# Patient Record
Sex: FEMALE | Race: WHITE | Employment: FULL TIME | URBAN - METROPOLITAN AREA
[De-identification: names, ages, dates, MRNs, and addresses within clinical notes are randomized per-mention and may not be internally consistent; named-entity substitution may affect disease eponyms.]

---

## 2019-08-05 ENCOUNTER — APPOINTMENT (OUTPATIENT)
Dept: GENERAL RADIOLOGY | Age: 44
End: 2019-08-05
Payer: COMMERCIAL

## 2019-08-05 ENCOUNTER — HOSPITAL ENCOUNTER (EMERGENCY)
Age: 44
Discharge: HOME | End: 2019-08-05
Attending: EMERGENCY MEDICINE
Payer: COMMERCIAL

## 2019-08-05 VITALS
WEIGHT: 210 LBS | HEART RATE: 67 BPM | HEIGHT: 62 IN | OXYGEN SATURATION: 100 % | DIASTOLIC BLOOD PRESSURE: 76 MMHG | BODY MASS INDEX: 38.64 KG/M2 | TEMPERATURE: 98.4 F | SYSTOLIC BLOOD PRESSURE: 134 MMHG | RESPIRATION RATE: 19 BRPM

## 2019-08-05 DIAGNOSIS — F41.1 ANXIETY STATE: ICD-10-CM

## 2019-08-05 DIAGNOSIS — R07.89 CHEST DISCOMFORT: Primary | ICD-10-CM

## 2019-08-05 LAB
A/G RATIO: 1.3 (ref 1.1–2.2)
ALBUMIN SERPL-MCNC: 4.1 G/DL (ref 3.4–5)
ALP BLD-CCNC: 41 U/L (ref 40–129)
ALT SERPL-CCNC: 15 U/L (ref 10–40)
ANION GAP SERPL CALCULATED.3IONS-SCNC: 10 MMOL/L (ref 3–16)
AST SERPL-CCNC: 22 U/L (ref 15–37)
BASOPHILS ABSOLUTE: 0.1 K/UL (ref 0–0.2)
BASOPHILS RELATIVE PERCENT: 1.1 %
BILIRUB SERPL-MCNC: 0.4 MG/DL (ref 0–1)
BILIRUBIN URINE: NEGATIVE
BLOOD, URINE: NEGATIVE
BUN BLDV-MCNC: 21 MG/DL (ref 7–20)
CALCIUM SERPL-MCNC: 9 MG/DL (ref 8.3–10.6)
CHLORIDE BLD-SCNC: 105 MMOL/L (ref 99–110)
CLARITY: CLEAR
CO2: 25 MMOL/L (ref 21–32)
COLOR: YELLOW
CREAT SERPL-MCNC: 1.2 MG/DL (ref 0.6–1.1)
D DIMER: <200 NG/ML DDU (ref 0–229)
EKG ATRIAL RATE: 64 BPM
EKG DIAGNOSIS: NORMAL
EKG P AXIS: 26 DEGREES
EKG P-R INTERVAL: 172 MS
EKG Q-T INTERVAL: 406 MS
EKG QRS DURATION: 88 MS
EKG QTC CALCULATION (BAZETT): 418 MS
EKG R AXIS: 18 DEGREES
EKG T AXIS: 29 DEGREES
EKG VENTRICULAR RATE: 64 BPM
EOSINOPHILS ABSOLUTE: 0.2 K/UL (ref 0–0.6)
EOSINOPHILS RELATIVE PERCENT: 3.4 %
EPITHELIAL CELLS, UA: 0 /HPF (ref 0–5)
GFR AFRICAN AMERICAN: 59
GFR NON-AFRICAN AMERICAN: 49
GLOBULIN: 3.1 G/DL
GLUCOSE BLD-MCNC: 98 MG/DL (ref 70–99)
GLUCOSE URINE: NEGATIVE MG/DL
HCG QUALITATIVE: NEGATIVE
HCT VFR BLD CALC: 39.3 % (ref 36–48)
HEMOGLOBIN: 13.5 G/DL (ref 12–16)
HYALINE CASTS: 1 /LPF (ref 0–8)
KETONES, URINE: NEGATIVE MG/DL
LEUKOCYTE ESTERASE, URINE: NEGATIVE
LYMPHOCYTES ABSOLUTE: 1.1 K/UL (ref 1–5.1)
LYMPHOCYTES RELATIVE PERCENT: 18.4 %
MCH RBC QN AUTO: 30.2 PG (ref 26–34)
MCHC RBC AUTO-ENTMCNC: 34.2 G/DL (ref 31–36)
MCV RBC AUTO: 88.1 FL (ref 80–100)
MICROSCOPIC EXAMINATION: YES
MONOCYTES ABSOLUTE: 0.4 K/UL (ref 0–1.3)
MONOCYTES RELATIVE PERCENT: 7.1 %
NEUTROPHILS ABSOLUTE: 4.4 K/UL (ref 1.7–7.7)
NEUTROPHILS RELATIVE PERCENT: 70 %
NITRITE, URINE: NEGATIVE
PDW BLD-RTO: 13.1 % (ref 12.4–15.4)
PH UA: 5.5 (ref 5–8)
PLATELET # BLD: 228 K/UL (ref 135–450)
PMV BLD AUTO: 8.1 FL (ref 5–10.5)
POTASSIUM SERPL-SCNC: 4.5 MMOL/L (ref 3.5–5.1)
PRO-BNP: 249 PG/ML (ref 0–124)
PROTEIN UA: 100 MG/DL
RBC # BLD: 4.46 M/UL (ref 4–5.2)
RBC UA: 0 /HPF (ref 0–4)
REASON FOR REJECTION: NORMAL
REJECTED TEST: NORMAL
SODIUM BLD-SCNC: 140 MMOL/L (ref 136–145)
SPECIFIC GRAVITY UA: 1.01 (ref 1–1.03)
T4 FREE: 1.3 NG/DL (ref 0.9–1.8)
TOTAL PROTEIN: 7.2 G/DL (ref 6.4–8.2)
TROPONIN: <0.01 NG/ML
TROPONIN: <0.01 NG/ML
TSH REFLEX: 5.34 UIU/ML (ref 0.27–4.2)
URINE REFLEX TO CULTURE: ABNORMAL
URINE TYPE: ABNORMAL
UROBILINOGEN, URINE: 0.2 E.U./DL
WBC # BLD: 6.2 K/UL (ref 4–11)
WBC UA: 0 /HPF (ref 0–5)

## 2019-08-05 PROCEDURE — 85379 FIBRIN DEGRADATION QUANT: CPT

## 2019-08-05 PROCEDURE — 83880 ASSAY OF NATRIURETIC PEPTIDE: CPT

## 2019-08-05 PROCEDURE — 96361 HYDRATE IV INFUSION ADD-ON: CPT

## 2019-08-05 PROCEDURE — 84439 ASSAY OF FREE THYROXINE: CPT

## 2019-08-05 PROCEDURE — 93005 ELECTROCARDIOGRAM TRACING: CPT | Performed by: PHYSICIAN ASSISTANT

## 2019-08-05 PROCEDURE — 99284 EMERGENCY DEPT VISIT MOD MDM: CPT

## 2019-08-05 PROCEDURE — 84443 ASSAY THYROID STIM HORMONE: CPT

## 2019-08-05 PROCEDURE — 6360000002 HC RX W HCPCS: Performed by: PHYSICIAN ASSISTANT

## 2019-08-05 PROCEDURE — 71046 X-RAY EXAM CHEST 2 VIEWS: CPT

## 2019-08-05 PROCEDURE — 96374 THER/PROPH/DIAG INJ IV PUSH: CPT

## 2019-08-05 PROCEDURE — 81001 URINALYSIS AUTO W/SCOPE: CPT

## 2019-08-05 PROCEDURE — 2580000003 HC RX 258: Performed by: PHYSICIAN ASSISTANT

## 2019-08-05 PROCEDURE — 6370000000 HC RX 637 (ALT 250 FOR IP): Performed by: PHYSICIAN ASSISTANT

## 2019-08-05 PROCEDURE — 85025 COMPLETE CBC W/AUTO DIFF WBC: CPT

## 2019-08-05 PROCEDURE — 80053 COMPREHEN METABOLIC PANEL: CPT

## 2019-08-05 PROCEDURE — 84484 ASSAY OF TROPONIN QUANT: CPT

## 2019-08-05 PROCEDURE — 84703 CHORIONIC GONADOTROPIN ASSAY: CPT

## 2019-08-05 RX ORDER — 0.9 % SODIUM CHLORIDE 0.9 %
1000 INTRAVENOUS SOLUTION INTRAVENOUS ONCE
Status: COMPLETED | OUTPATIENT
Start: 2019-08-05 | End: 2019-08-05

## 2019-08-05 RX ORDER — LORAZEPAM 2 MG/ML
1 INJECTION INTRAMUSCULAR ONCE
Status: COMPLETED | OUTPATIENT
Start: 2019-08-05 | End: 2019-08-05

## 2019-08-05 RX ORDER — ASPIRIN 81 MG/1
324 TABLET, CHEWABLE ORAL ONCE
Status: COMPLETED | OUTPATIENT
Start: 2019-08-05 | End: 2019-08-05

## 2019-08-05 RX ADMIN — SODIUM CHLORIDE 1000 ML: 9 INJECTION, SOLUTION INTRAVENOUS at 10:11

## 2019-08-05 RX ADMIN — ASPIRIN 81 MG 324 MG: 81 TABLET ORAL at 08:03

## 2019-08-05 RX ADMIN — LORAZEPAM 1 MG: 2 INJECTION INTRAMUSCULAR; INTRAVENOUS at 08:03

## 2019-08-05 ASSESSMENT — ENCOUNTER SYMPTOMS
ABDOMINAL PAIN: 0
NAUSEA: 0
VOMITING: 0
SHORTNESS OF BREATH: 0
RHINORRHEA: 0
DIARRHEA: 0
COUGH: 0

## 2019-08-05 ASSESSMENT — PAIN DESCRIPTION - PAIN TYPE: TYPE: ACUTE PAIN

## 2019-08-05 ASSESSMENT — PAIN DESCRIPTION - ORIENTATION: ORIENTATION: LEFT

## 2019-08-05 ASSESSMENT — PAIN DESCRIPTION - LOCATION: LOCATION: SHOULDER

## 2019-08-05 ASSESSMENT — PAIN SCALES - GENERAL: PAINLEVEL_OUTOF10: 2

## 2019-08-05 NOTE — ED PROVIDER NOTES
[facility]        Pt Name: Sarah Diaz  MRN: [id number]  [place] 1975  Date of evaluation: 8/5/2019  Provider: Abiodun Noyola PA-C  PCP: Jessy Magana    This patient was seen and evaluated by the attending physician Huang Luciano DO.      CHIEF COMPLAINT       Chief Complaint   Patient presents with    Panic Attack     panic attack this am while driving to work.  still has blurry vision and \"feels bluh\"       HISTORY OF PRESENT ILLNESS   (Location/Symptom, Timing/Onset, Context/Setting, Quality, Duration, Modifying Factors, Severity)  Note limiting factors. Sarah Diaz is a 39 y.o. female who presents to the emergency department today for evaluation for chest discomfort. The patient states that she was driving to work, and she states that she noticed chest discomfort to the left side of her chest, she states that she also had some chest tightness, palpitations and shortness of breath. Patient states that she has a history of panic attacks, and she thought that her symptoms today were due to a panic attack. Patient states that she got to work, and she states that most of her symptoms resolved but she states that she is reporting a \"heavy\" feeling throughout her body  Patient states that at one point she did have some blurry vision bilaterally, again thought this was due to her panic attack she denies any vision changes to me at this time. She states that she did have a headache as well, this is not the worst headache of her life, this is not an atypical headache. She is only rating her pain is a 2/10. She denies any numbness, tingling or weakness. She states that her chest discomfort has essentially resolved she is just reporting the heavy feeling throughout her body. She denies any abdominal pain. No nausea, vomiting or diarrhea. No fever chills but no cough or congestion.   Patient states that she has a 100 mg by mouth daily. Historical Med      topiramate (TOPAMAX) 25 MG tablet Take 25 mg by mouth 2 times daily. Historical Med      rivaroxaban (XARELTO) 20 MG TABS tablet Take 20 mg by mouth. Historical Med               ALLERGIES     Methyldopa; Oxycodone-acetaminophen; Penicillins;  Telithromycin; and Venlafaxine    FAMILYHISTORY       Family History   Problem Relation Age of Onset    Diabetes Mother     Stroke Mother     Hypertension Mother     High Cholesterol Mother     Obesity Mother     Alcohol Abuse Father     Cancer Father     Hypertension Father     High Cholesterol Brother     Kidney Disease Brother           SOCIAL HISTORY       Social History     Socioeconomic History    Marital status:      Spouse name: None    Number of children: None    Years of education: None    Highest education level: None   Occupational History    None   Social Needs    Financial resource strain: None    Food insecurity:     Worry: None     Inability: None    Transportation needs:     Medical: None     Non-medical: None   Tobacco Use    Smoking status: Never Smoker    Smokeless tobacco: Never Used   Substance and Sexual Activity    Alcohol use: Not Currently    Drug use: Never    Sexual activity: None   Lifestyle    Physical activity:     Days per week: None     Minutes per session: None    Stress: None   Relationships    Social connections:     Talks on phone: None     Gets together: None     Attends [employer] service: None     Active member of club or organization: None     Attends meetings of clubs or organizations: None     Relationship status: None    Intimate partner violence:     Fear of current or ex partner: None     Emotionally abused: None     Physically abused: None     Forced sexual activity: None   Other Topics Concern    None   Social History Narrative    None       SCREENINGS             PHYSICAL EXAM    (up to 7 for level 4, 8 or more for level 5)     ED Triage Vitals [08/05/19 [facility]  [phone]    As needed, If symptoms worsen      DISCHARGE MEDICATIONS:  Discharge Medication List as of 8/5/2019 12:36 PM          DISCONTINUED MEDICATIONS:  Discharge Medication List as of 8/5/2019 12:36 PM                 (Please note that portions ofthis note were completed with a voice recognition program.  Efforts were made to edit the dictations but occasionally words are mis-transcribed.)    Darryle Pulley, PA-C (electronically signed)            Darryle Pulley, PA-C  08/05/19 2899

## 2019-08-06 LAB
EKG ATRIAL RATE: 57 BPM
EKG DIAGNOSIS: NORMAL
EKG P AXIS: 34 DEGREES
EKG P-R INTERVAL: 178 MS
EKG Q-T INTERVAL: 424 MS
EKG QRS DURATION: 88 MS
EKG QTC CALCULATION (BAZETT): 412 MS
EKG R AXIS: 27 DEGREES
EKG T AXIS: 39 DEGREES
EKG VENTRICULAR RATE: 57 BPM

## 2019-08-06 PROCEDURE — 93010 ELECTROCARDIOGRAM REPORT: CPT | Performed by: INTERNAL MEDICINE

## 2019-08-06 NOTE — ED PROVIDER NOTES
ED Course as of Aug 06 1028   Mon Aug 05, 2019   0808 Sinus rhythm at 64. Normal axis. . No acute ST-T changes. EKG 12 Lead [WL]   [id number] I have personally performed and/or participated in the history, exam and medical decision making and agree with all pertinent clinical information. I have also reviewed and agree with the past medical, family and social history unless otherwise noted. Patient presents for evaluation of acute onset palpitations, dyspnea, chest pressure while driving. She states this feels similar to prior panic attacks and was self-limited and she is currently asymptomatic. She denies recent illness fever chills or any exertional symptoms. She does have history of prior DVT, is no longer on blood thinners and has chronic lower extremity edema. She also reports strong family history of heart disease as well as personal history of hypertension and hyperlipidemia. Patient with overall symptoms consistent with likely panic attack similar to prior panic attacks but with chest symptoms, will rule out cardiac disease, EKG normal will trend troponin and check d-dimer to rule out blood clots due to history of DVT    [WL]   0857 XR CHEST STANDARD (2 VW) [WL]   Tue Aug 06, 2019   1027 Troponin:    Troponin <0.01 [WL]   1027 Comprehensive Metabolic Panel(!):    Sodium 140   Potassium 4.5   Chloride 105   CO2 25   Anion Gap 10   Glucose 98   BUN 21(!)   Creatinine 1.2(!)   GFR Non- 49(!)   GFR  59(!)   Calcium 9.0   Total Protein 7.2   Albumin 4.1   Albumin/Globulin Ratio 1.3   Bilirubin 0.4   Alk Phos 41   ALT 15   AST 22   Globulin 3.1 [WL]   1027 Brain Natriuretic Peptide(!):    Pro-(!) [WL]   1028 Urinalysis Reflex to Culture(!):    Color, UA YELLOW   Clarity, UA Clear   Glucose, UA Negative   Bilirubin, Urine Negative   Ketones, Urine Negative   Specific Gravity, UA 1.011   Blood, Urine Negative   pH, UA 5.5   Protein, (!)   Urobilinogen, Urine 0.2   Nitrite, Urine Negative   Leukocyte Esterase, Urine Negative   Microscopic Examination YES   Urine Reflex to Culture Not Indicated   Urine Type Not Specified [WL]   1028 Microscopic Urinalysis:    Hyaline Casts, UA 1   WBC, UA 0   RBC, UA 0   Epi Cells 0 [WL]   1028 D-Dimer, Quantitative:    D-Dimer, Quant <200 [WL]   1028 HCG Qualitative, Serum:    hCG Qual Negative [WL]   1028 CBC Auto Differential:    WBC 6.2   RBC 4.46   Hemoglobin Quant 13.5   Hematocrit 39.3   MCV 88.1   MCH 30.2   MCHC 34.2   RDW 13.1   Platelet Count 878   MPV 8.1   Neutrophils % 70.0   Lymphocyte % 18.4   Monocytes % 7.1   Eosinophils % 3.4   Basophils % 1.1   Neutrophils # 4.4   Lymphocytes # 1.1   Monocytes # 0.4   Eosinophils # 0.2   Basophils # 0.1 [WL]      ED Course User Index  [WL] DO Juli Knowles DO  08/06/19 1028